# Patient Record
Sex: MALE | Race: WHITE | Employment: FULL TIME | URBAN - METROPOLITAN AREA
[De-identification: names, ages, dates, MRNs, and addresses within clinical notes are randomized per-mention and may not be internally consistent; named-entity substitution may affect disease eponyms.]

---

## 2017-01-02 ENCOUNTER — OFFICE VISIT (OUTPATIENT)
Dept: PHYSICAL THERAPY | Age: 57
End: 2017-01-02
Attending: PODIATRIST
Payer: COMMERCIAL

## 2017-01-02 DIAGNOSIS — M72.2 PLANTAR FASCIITIS: Primary | ICD-10-CM

## 2017-01-02 PROCEDURE — 97110 THERAPEUTIC EXERCISES: CPT

## 2017-01-02 PROCEDURE — 97162 PT EVAL MOD COMPLEX 30 MIN: CPT

## 2017-01-02 NOTE — PROGRESS NOTES
LOWER EXTREMITY EVALUATION:   Referring Physician: Dr. Toan Gould  Diagnosis: L and R plantar fascitis    Date of Service: 1/2/2017     PATIENT SUMMARY   Gavi Swain is a 60year old y/o male who presents to therapy today with complaints of L and R a flexed. Accessory motion: decreased calcaneal glides laterally, decreased talocrural jt glides posteriorly.     Flexibility:    Gastroc-soleus: Rdecreased; L decreased    Strength/MMT:   Knee Foot/Ankle   L and R heel raises, no pain, 4/5 strengt [phone]    Sincerely,  Electronically signed by therapist: Marco Rodríguez

## 2017-01-04 ENCOUNTER — OFFICE VISIT (OUTPATIENT)
Dept: PHYSICAL THERAPY | Age: 57
End: 2017-01-04
Attending: PODIATRIST
Payer: COMMERCIAL

## 2017-01-04 PROCEDURE — 97140 MANUAL THERAPY 1/> REGIONS: CPT

## 2017-01-04 PROCEDURE — 97110 THERAPEUTIC EXERCISES: CPT

## 2017-01-04 PROCEDURE — 97112 NEUROMUSCULAR REEDUCATION: CPT

## 2017-01-04 NOTE — PROGRESS NOTES
Dx: B plantar fascitis         Authorized # of Visits:  8         Next MD visit: none scheduled  Fall Risk: standard         Precautions: n/a             Subjective: Reports doing his HEP. Soleus stretches offer good relief of B foot pain for hours.   Does

## 2017-01-09 ENCOUNTER — APPOINTMENT (OUTPATIENT)
Dept: PHYSICAL THERAPY | Age: 57
End: 2017-01-09
Attending: PODIATRIST
Payer: COMMERCIAL

## 2017-01-11 ENCOUNTER — OFFICE VISIT (OUTPATIENT)
Dept: PHYSICAL THERAPY | Age: 57
End: 2017-01-11
Attending: PODIATRIST
Payer: COMMERCIAL

## 2017-01-11 PROCEDURE — 97112 NEUROMUSCULAR REEDUCATION: CPT

## 2017-01-11 NOTE — PROGRESS NOTES
Dx: B plantar fascitis         Authorized # of Visits:  8         Next MD visit: none scheduled  Fall Risk: standard         Precautions: n/a             Subjective:B foot symptoms continue to improve, quicker than he expected.   Many hours of no plantar fo from floor w/o foot pain.   -Restore L and R ankle strength to WNL's so pt can ambulate in the community with little to no difficulty or foot pain.   -Little to no difficulty or pain with amb 1 flight of stairs reciprocally.   -Independent in progressive H

## 2017-01-16 ENCOUNTER — OFFICE VISIT (OUTPATIENT)
Dept: PHYSICAL THERAPY | Age: 57
End: 2017-01-16
Attending: PODIATRIST
Payer: COMMERCIAL

## 2017-01-16 PROCEDURE — 97140 MANUAL THERAPY 1/> REGIONS: CPT

## 2017-01-16 PROCEDURE — 97112 NEUROMUSCULAR REEDUCATION: CPT

## 2017-01-16 NOTE — PROGRESS NOTES
Dx: B plantar fascitis         Authorized # of Visits:  8         Next MD visit: none scheduled  Fall Risk: standard         Precautions: n/a             Subjective: Was away on business. Reports had no noticeable R foot symptoms, little on L while away. little to no foot pain or difficulty.  .  -Restore L and R soleus muscle length to WFL's so pt can perform squatting activities such as retrieving bag of groceries from floor w/o foot pain.   -Restore L and R ankle strength to WNL's so pt can ambulate in th

## 2017-01-18 ENCOUNTER — OFFICE VISIT (OUTPATIENT)
Dept: PHYSICAL THERAPY | Age: 57
End: 2017-01-18
Attending: PODIATRIST
Payer: COMMERCIAL

## 2017-01-18 PROCEDURE — 97112 NEUROMUSCULAR REEDUCATION: CPT

## 2017-01-18 NOTE — PROGRESS NOTES
Dx: B plantar fascitis         Authorized # of Visits:  8         Next MD visit: none scheduled  Fall Risk: standard         Precautions: n/a             Subjective: Morning heel/foot pain nearly resolved R, minimal on L.         Objective:       Date: 1/4/ heel/ toe x 20 reps  -single elg x 15 each Lateral walks with red band at ankles x 25ft x 4 reps         On table:  Stretch L and R hip intrinsic ER's  3 min                                   Assessment: Progressing quickly with R foot symptoms nearly reso

## 2017-01-23 ENCOUNTER — OFFICE VISIT (OUTPATIENT)
Dept: PHYSICAL THERAPY | Age: 57
End: 2017-01-23
Attending: PODIATRIST
Payer: COMMERCIAL

## 2017-01-23 PROCEDURE — 97112 NEUROMUSCULAR REEDUCATION: CPT

## 2017-01-23 NOTE — PROGRESS NOTES
Dx: B plantar fascitis         Authorized # of Visits:  8 to 10         Next MD visit: none scheduled  Fall Risk: standard         Precautions: n/a             Subjective: Reports best results so far with morning pain.   No pain noticed but mild tenderness reps  -single elg x 15 each Wobble:  -B heel/ toe x 20 reps  -singleleg x 15 each      Wobble:  -B ankle inversion/eversion x 20 reps  -single elg x 10 2 sets each Shuttle: plyo's, single leg, level 2 x 15 each  reciprocally x 30 Rebounder, dynamic balance

## 2017-01-25 ENCOUNTER — OFFICE VISIT (OUTPATIENT)
Dept: PHYSICAL THERAPY | Age: 57
End: 2017-01-25
Attending: PODIATRIST
Payer: COMMERCIAL

## 2017-01-25 PROCEDURE — 97112 NEUROMUSCULAR REEDUCATION: CPT

## 2017-01-25 NOTE — PROGRESS NOTES
Dx: B plantar fascitis         Authorized # of Visits:  8 to 10         Next MD visit: none scheduled  Fall Risk: standard         Precautions: n/a             Subjective:  Pain out of bed in the morning at feet, heels, R 0/10,  L 4/10 (heel only).   L decr scrunches x 2 min Wobble:  -B heel/ toe x 20 reps  -single elg x 10 2 sets each Shuttle:   -B heel raises level 8 x 20 reps  -single leg, level 6, x 15 2 sets each Wobble:  -B heel/ toe x 20 reps  -single elg x 15 each Wobble:  -B heel/ toe x 20 reps  -sin no difficulty or foot pain. Being met  -Little to no difficulty or pain with amb 1 flight of stairs reciprocally. In progress  -Independent in progressive HEP. Plan:   Cont with current focus.        Charges:   NM re ed 3    Total Timed Treatment: 45 mi

## 2017-01-30 ENCOUNTER — OFFICE VISIT (OUTPATIENT)
Dept: PHYSICAL THERAPY | Age: 57
End: 2017-01-30
Attending: PODIATRIST
Payer: COMMERCIAL

## 2017-01-30 PROCEDURE — 97112 NEUROMUSCULAR REEDUCATION: CPT

## 2017-01-30 NOTE — PROGRESS NOTES
Dx: B plantar fascitis         Authorized # of Visits:  8 to 10         Next MD visit: none scheduled  Fall Risk: standard         Precautions: n/a             Subjective:  Pain out of bed in the morning at feet, heels, R 0/10,  L 2-3/10 (heel only).   L de pelvis x 15 2 sets each ASU's with contra LE high step, 8 inch box step,maintain neutral pelvis x 20 each   Review HEP:  L and R gastroc and soleus stretches  4 min  -L and R digit flexion with towel scrunches x 2 min Wobble:  -B heel/ toe x 20 reps  -sing improved, L>R. L > 4/5, R 4/5. Balance improved, > 30 seconds each. Goals:   -Restore L and R gastrocnemius length to WFL's so pt can ambulate in the morning with little to no foot pain or difficulty. Met R foot.   -Restore L and R soleus muscle length t

## 2017-02-01 ENCOUNTER — OFFICE VISIT (OUTPATIENT)
Dept: PHYSICAL THERAPY | Age: 57
End: 2017-02-01
Attending: PODIATRIST
Payer: COMMERCIAL

## 2017-02-06 ENCOUNTER — OFFICE VISIT (OUTPATIENT)
Dept: PHYSICAL THERAPY | Age: 57
End: 2017-02-06
Attending: PODIATRIST
Payer: COMMERCIAL

## 2017-02-06 PROCEDURE — 97112 NEUROMUSCULAR REEDUCATION: CPT

## 2017-02-06 NOTE — PROGRESS NOTES
Dx: B plantar fascitis         Authorized # of Visits:  8 to 10         Next MD visit: none scheduled  Fall Risk: standard         Precautions: n/a             Subjective: Activity. R foot 100% improved, no symptoms with any recent activity.   L 90% impr Rebounder, dynamic balance: Throw/catch #2 ball, all 3 directions, single leg, x 15 each  5 min Rebounder, dynamic balance:   Throw/catch #4 ball, all 3 directions, single leg, x 15 each  5 min Shuttle: plyo's, single leg, level 3 x 15 each  reciprocally ed 3    Total Timed Treatment: 45 min  Total Treatment Time: 45 min

## 2017-02-08 ENCOUNTER — APPOINTMENT (OUTPATIENT)
Dept: PHYSICAL THERAPY | Age: 57
End: 2017-02-08
Attending: PODIATRIST
Payer: COMMERCIAL

## 2017-02-13 ENCOUNTER — OFFICE VISIT (OUTPATIENT)
Dept: PHYSICAL THERAPY | Age: 57
End: 2017-02-13
Attending: PODIATRIST
Payer: COMMERCIAL

## 2017-02-13 PROCEDURE — 97112 NEUROMUSCULAR REEDUCATION: CPT

## 2017-02-13 NOTE — PROGRESS NOTES
Dx: B plantar fascitis         Authorized # of Visits:  8 to 10         Next MD visit: none scheduled  Fall Risk: standard         Precautions: n/a         Physical Therapy Discharge Report  10 sessions completed        Subjective: Activity.   R foot 100% 2-4  5 min: Bike upright:  Level 2-4  5 min: Bike upright:  Level 2-4  5 min   Shuttle:   -B heel raises level 8 x 20 reps  -single leg, level 6, x 15 2 sets each Shuttle:   -B heel raises level 8 x 20 reps  -single leg, level8, x 10 2 sets each R and L qu box step, x 10 2 sets each  1/4 lunge on toes x 10 2 sets each    Deep lunge x 10 each Sit: toe crunches, digit flexion with towel x 30  each Sit: toe crunches, digit flexion with towel x 30  each   On table:  Stretch L and R hip intrinsic ER's  3 min Sit:

## 2017-10-11 PROCEDURE — 87086 URINE CULTURE/COLONY COUNT: CPT | Performed by: INTERNAL MEDICINE

## 2018-10-24 PROBLEM — M25.462 EFFUSION, LEFT KNEE: Status: ACTIVE | Noted: 2018-10-24

## 2019-12-20 PROBLEM — M25.462 EFFUSION, LEFT KNEE: Status: RESOLVED | Noted: 2018-10-24 | Resolved: 2019-12-20

## 2021-08-08 ENCOUNTER — HOSPITAL ENCOUNTER (EMERGENCY)
Facility: HOSPITAL | Age: 61
Discharge: HOME | End: 2021-08-08
Attending: EMERGENCY MEDICINE
Payer: COMMERCIAL

## 2021-08-08 VITALS
WEIGHT: 240.31 LBS | HEART RATE: 80 BPM | RESPIRATION RATE: 16 BRPM | OXYGEN SATURATION: 97 % | DIASTOLIC BLOOD PRESSURE: 80 MMHG | TEMPERATURE: 99 F | SYSTOLIC BLOOD PRESSURE: 162 MMHG | BODY MASS INDEX: 34 KG/M2

## 2021-08-08 DIAGNOSIS — S61.313A LACERATION OF LEFT MIDDLE FINGER WITHOUT FOREIGN BODY WITH DAMAGE TO NAIL, INITIAL ENCOUNTER: Primary | ICD-10-CM

## 2021-08-08 PROCEDURE — 12001 RPR S/N/AX/GEN/TRNK 2.5CM/<: CPT

## 2021-08-08 PROCEDURE — 99282 EMERGENCY DEPT VISIT SF MDM: CPT

## 2021-08-08 PROCEDURE — 99283 EMERGENCY DEPT VISIT LOW MDM: CPT

## 2021-08-08 RX ORDER — LIDOCAINE HYDROCHLORIDE 10 MG/ML
INJECTION, SOLUTION INFILTRATION; PERINEURAL
Status: COMPLETED
Start: 2021-08-08 | End: 2021-08-08

## 2021-08-08 NOTE — ED PROVIDER NOTES
Patient Seen in: [facility] Emergency Department      History   Patient presents with:  Laceration/Abrasion    Stated Complaint: finger laceration    HPI/Subjective:   HPI    59-year-old male who cut his left middle finger just proximal to the end o using digital block with 1% lidocaine of the left middle finger. The wound was irrigated with normal saline. The wound was prepped and draped in the normal sterile fashion. The wound was explored for foreign bodies and none were found.  The edges were reap

## 2021-08-16 ENCOUNTER — HOSPITAL ENCOUNTER (OUTPATIENT)
Age: 61
Discharge: LEFT BEFORE DISCHARGE | End: 2021-08-16
Payer: COMMERCIAL

## 2021-08-20 ENCOUNTER — HOSPITAL ENCOUNTER (OUTPATIENT)
Age: 61
Discharge: HOME | End: 2021-08-20
Payer: COMMERCIAL

## 2021-08-20 VITALS
HEIGHT: 71 IN | SYSTOLIC BLOOD PRESSURE: 140 MMHG | DIASTOLIC BLOOD PRESSURE: 67 MMHG | BODY MASS INDEX: 29.4 KG/M2 | TEMPERATURE: 98 F | HEART RATE: 56 BPM | OXYGEN SATURATION: 97 % | WEIGHT: 210 LBS | RESPIRATION RATE: 16 BRPM

## 2021-08-20 DIAGNOSIS — Z11.52 ENCOUNTER FOR SCREENING FOR COVID-19: Primary | ICD-10-CM

## 2021-08-20 LAB — SARS-COV-2 RNA RESP QL NAA+PROBE: NOT DETECTED

## 2021-08-20 PROCEDURE — 99202 OFFICE O/P NEW SF 15 MIN: CPT | Performed by: PHYSICIAN ASSISTANT

## 2021-08-20 PROCEDURE — U0002 COVID-19 LAB TEST NON-CDC: HCPCS | Performed by: PHYSICIAN ASSISTANT

## 2021-08-20 RX ORDER — MULTIVIT-MIN/IRON/FOLIC ACID/K 18-600-40
CAPSULE ORAL
COMMUNITY
Start: 2020-03-01

## 2021-08-20 RX ORDER — MULTIVIT WITH MINERALS/LUTEIN
TABLET ORAL
COMMUNITY
Start: 2020-03-01

## 2021-08-20 RX ORDER — TAMSULOSIN HYDROCHLORIDE 0.4 MG/1
0.4 CAPSULE ORAL NIGHTLY
COMMUNITY
Start: 2021-07-14

## 2021-08-20 NOTE — ED INITIAL ASSESSMENT (HPI)
The patient is here for a COVID test for travel. He leaves tomorrow for [place] ([facility]). Denies any symptoms. He has received his Pfiezer vaccines, last dose was 4/2021.

## 2021-08-20 NOTE — ED PROVIDER NOTES
Patient Seen in: Immediate [address] [place] [address]      History   Patient presents with:  Covid-19 Test    Stated Complaint: Covid-19 Test    HPI/Subjective:   HPI    Valerie Guaman is a 67-year-old male who presents for Covid testing today.   He has a past medical hi kg/m²         Physical Exam    Nursing note reviewed. Vital signs reviewed. Constitutional: Oriented to person, place, and time. Patient appears well-developed and well-nourished, non-toxic and in no acute distress. Actively wearing a mask.    Head: Norm

## 2021-11-09 ENCOUNTER — HOSPITAL ENCOUNTER (OUTPATIENT)
Age: 61
Discharge: HOME | End: 2021-11-09
Payer: COMMERCIAL

## 2021-11-09 VITALS
OXYGEN SATURATION: 97 % | HEART RATE: 69 BPM | BODY MASS INDEX: 30.8 KG/M2 | HEIGHT: 71 IN | WEIGHT: 220 LBS | SYSTOLIC BLOOD PRESSURE: 151 MMHG | DIASTOLIC BLOOD PRESSURE: 73 MMHG | TEMPERATURE: 98 F | RESPIRATION RATE: 16 BRPM

## 2021-11-09 DIAGNOSIS — Z20.822 COVID-19 RULED OUT BY LABORATORY TESTING: Primary | ICD-10-CM

## 2021-11-09 PROCEDURE — 99212 OFFICE O/P EST SF 10 MIN: CPT | Performed by: PHYSICIAN ASSISTANT

## 2021-11-09 PROCEDURE — U0002 COVID-19 LAB TEST NON-CDC: HCPCS | Performed by: NURSE PRACTITIONER

## 2021-11-09 NOTE — ED PROVIDER NOTES
Patient Seen [facility]: [facility] [address]      History   Patient presents with:  Testing    Stated Complaint: covid test    Subjective:   HPI    26-year-old male here for Covid testing due to travel. Patient has been fully vaccinated.   Patient de He is well-developed. HENT:      Head: Normocephalic.       Right Ear: External ear normal.      Left Ear: External ear normal.      Nose: Nose normal.      Mouth/Throat:      Mouth: Mucous membranes are moist.   Eyes:      Conjunctiva/sclera: Conjunctiva the patient, who expresses understanding. All questions and concerns are addressed to the patient's satisfaction prior to discharge today. Previous conversations with PCP and charts were reviewed.                                 Disposition and Plan     Cl

## 2021-11-17 ENCOUNTER — HOSPITAL ENCOUNTER (OUTPATIENT)
Age: 61
Discharge: HOME | End: 2021-11-17
Payer: COMMERCIAL

## 2021-11-17 VITALS
HEART RATE: 67 BPM | TEMPERATURE: 98 F | SYSTOLIC BLOOD PRESSURE: 160 MMHG | WEIGHT: 215 LBS | BODY MASS INDEX: 30.1 KG/M2 | OXYGEN SATURATION: 98 % | DIASTOLIC BLOOD PRESSURE: 93 MMHG | HEIGHT: 71 IN | RESPIRATION RATE: 16 BRPM

## 2021-11-17 DIAGNOSIS — Z11.52 ENCOUNTER FOR SCREENING FOR COVID-19: Primary | ICD-10-CM

## 2021-11-17 PROCEDURE — 99212 OFFICE O/P EST SF 10 MIN: CPT | Performed by: NURSE PRACTITIONER

## 2021-11-17 PROCEDURE — U0002 COVID-19 LAB TEST NON-CDC: HCPCS | Performed by: NURSE PRACTITIONER

## 2021-11-17 NOTE — ED PROVIDER NOTES
[facility] [address]      History   Patient presents with:  Covid-19 Test    Stated Complaint: covid test for travel    Subjective:   Patient presents to immediate care for COVID testing.   Patient states he needs Covid testing distress. Appearance: Normal appearance. He is not ill-appearing. HENT:      Head: Normocephalic. Cardiovascular:      Rate and Rhythm: Normal rate.    Pulmonary:      Effort: Pulmonary effort is normal.   Musculoskeletal:         General: Normal ra

## 2022-02-18 ENCOUNTER — HOSPITAL ENCOUNTER (OUTPATIENT)
Age: 62
Discharge: HOME | End: 2022-02-18
Payer: COMMERCIAL

## 2022-02-18 VITALS
WEIGHT: 225 LBS | DIASTOLIC BLOOD PRESSURE: 63 MMHG | RESPIRATION RATE: 18 BRPM | TEMPERATURE: 99 F | OXYGEN SATURATION: 96 % | HEART RATE: 73 BPM | SYSTOLIC BLOOD PRESSURE: 135 MMHG | BODY MASS INDEX: 31.5 KG/M2 | HEIGHT: 71 IN

## 2022-02-18 DIAGNOSIS — Z20.822 ENCOUNTER FOR LABORATORY TESTING FOR COVID-19 VIRUS: Primary | ICD-10-CM

## 2022-02-18 LAB — SARS-COV-2 RNA RESP QL NAA+PROBE: NOT DETECTED

## 2022-02-18 PROCEDURE — 99212 OFFICE O/P EST SF 10 MIN: CPT | Performed by: NURSE PRACTITIONER

## 2022-02-18 PROCEDURE — U0002 COVID-19 LAB TEST NON-CDC: HCPCS | Performed by: NURSE PRACTITIONER

## 2022-07-20 ENCOUNTER — HOSPITAL ENCOUNTER (OUTPATIENT)
Age: 62
Discharge: HOME | End: 2022-07-20
Payer: COMMERCIAL

## 2022-07-20 VITALS
WEIGHT: 225 LBS | OXYGEN SATURATION: 97 % | HEIGHT: 71 IN | HEART RATE: 74 BPM | BODY MASS INDEX: 31.5 KG/M2 | DIASTOLIC BLOOD PRESSURE: 74 MMHG | TEMPERATURE: 99 F | RESPIRATION RATE: 20 BRPM | SYSTOLIC BLOOD PRESSURE: 152 MMHG

## 2022-07-20 DIAGNOSIS — U07.1 COVID-19: Primary | ICD-10-CM

## 2022-07-20 DIAGNOSIS — J20.9 ACUTE BRONCHITIS, UNSPECIFIED ORGANISM: ICD-10-CM

## 2022-07-20 LAB — SARS-COV-2 RNA RESP QL NAA+PROBE: DETECTED

## 2022-07-20 PROCEDURE — U0002 COVID-19 LAB TEST NON-CDC: HCPCS | Performed by: NURSE PRACTITIONER

## 2022-07-20 PROCEDURE — 99213 OFFICE O/P EST LOW 20 MIN: CPT | Performed by: NURSE PRACTITIONER

## 2022-07-20 RX ORDER — BUDESONIDE AND FORMOTEROL FUMARATE DIHYDRATE 160; 4.5 UG/1; UG/1
2 AEROSOL RESPIRATORY (INHALATION) 2 TIMES DAILY
Qty: 10.2 G | Refills: 0 | Status: SHIPPED | OUTPATIENT
Start: 2022-07-20

## 2022-07-20 RX ORDER — ALBUTEROL SULFATE 90 UG/1
2 AEROSOL, METERED RESPIRATORY (INHALATION) EVERY 4 HOURS PRN
Qty: 18 G | Refills: 0 | Status: SHIPPED | OUTPATIENT
Start: 2022-07-20

## 2024-02-13 ENCOUNTER — HOSPITAL ENCOUNTER (OUTPATIENT)
Age: 64
Discharge: HOME | End: 2024-02-13
Payer: COMMERCIAL

## 2024-02-13 VITALS
HEART RATE: 85 BPM | RESPIRATION RATE: 18 BRPM | SYSTOLIC BLOOD PRESSURE: 152 MMHG | DIASTOLIC BLOOD PRESSURE: 74 MMHG | OXYGEN SATURATION: 96 % | TEMPERATURE: 98 F

## 2024-02-13 DIAGNOSIS — J34.89 STUFFY AND RUNNY NOSE: ICD-10-CM

## 2024-02-13 DIAGNOSIS — B96.89 BACTERIAL SINUSITIS: Primary | ICD-10-CM

## 2024-02-13 DIAGNOSIS — R05.9 COUGH: ICD-10-CM

## 2024-02-13 DIAGNOSIS — J32.9 BACTERIAL SINUSITIS: Primary | ICD-10-CM

## 2024-02-13 LAB
POCT INFLUENZA A: NEGATIVE
POCT INFLUENZA B: NEGATIVE
SARS-COV-2 RNA RESP QL NAA+PROBE: NOT DETECTED

## 2024-02-13 PROCEDURE — U0002 COVID-19 LAB TEST NON-CDC: HCPCS | Performed by: NURSE PRACTITIONER

## 2024-02-13 PROCEDURE — 99213 OFFICE O/P EST LOW 20 MIN: CPT | Performed by: NURSE PRACTITIONER

## 2024-02-13 PROCEDURE — 87502 INFLUENZA DNA AMP PROBE: CPT | Performed by: NURSE PRACTITIONER

## 2024-02-13 RX ORDER — ALBUTEROL SULFATE 90 UG/1
1-2 AEROSOL, METERED RESPIRATORY (INHALATION)
Qty: 18 G | Refills: 0 | Status: SHIPPED | OUTPATIENT
Start: 2024-02-13

## 2024-02-13 RX ORDER — AMOXICILLIN AND CLAVULANATE POTASSIUM 875; 125 MG/1; MG/1
1 TABLET, FILM COATED ORAL 2 TIMES DAILY
Qty: 14 TABLET | Refills: 0 | Status: SHIPPED | OUTPATIENT
Start: 2024-02-13 | End: 2024-02-20

## 2024-02-13 NOTE — DISCHARGE INSTRUCTIONS
Use inhaler (1-2 puffs every 4-6 hours) with spacer as needed for chest tightness, wheezing, and/or bronchospasm      Upper respiratory infection supportive care measures to try as applicable:  General:   - Wash hands often   - Disinfect your environment, linens, electronics, etc.   - Drink plenty of fluids (water, Pedialyte, etc.)   - Get plenty of rest and sleep with head elevated to help with sinus drainage and throat irritation   - Avoid having air blow on your face as this can worsen congestion / cough   - Do not share utensils or drinks   - Alternate Ibuprofen (adult: 600mg) and Tylenol (adult: 650-1000mg) as needed for pain / body aches / fever   - Symptoms may take a few weeks to resolve   - You may benefit from taking a daily multivitamin   - You may benefit from Zinc (~20mg) every other day for a week while sick   - You may benefit from Vitamin D daily (~2000u)   - Change toothbrush    Sore throat:   - Salt water gargles throughout the day for sore throat   - Cepacol lozenges as needed for sore throat    Cough / Sinus:   - You may benefit from spoonfuls (and/or added to warm drinks) of honey throughout the day for cough   - You may benefit from taking a decongestant (e.g. Sudafed - pseudoephedrine [behind the pharmacy counter]) (may temporarily elevate your heart rate and blood pressure)   - You may benefit from using a humidifier and/or steam showers   - You may benefit from Flonase nasal spray daily   - You may benefit from taking a daily allergy medication (e.g. Zyrtec, Xyzal, etc.)   - You may benefit from boiling water with lemon and cayenne pepper, then breathing in the steam (you can cover your head with a towel to help funnel the steam)

## 2024-02-13 NOTE — ED PROVIDER NOTES
History     Chief Complaint   Patient presents with    Cough/URI       Subjective:   HPI    Ruben Argueta, 63 year old male with notable medical history of hearing loss, seasonal rhinitis, asthma who presents with sinus congestion and cough. Patient reports s/s started 3-4 days ago w/o fever, chills, n/v.  He reports worsening sinus pressure / pain, and some burning over the past couple days. He reports usually having his symptoms start to improve after a few days, but these symptoms are stronger and lasting longer than usual.        Objective:   Past Medical History:   Diagnosis Date    Back pain     Unspecified sleep apnea HST 9-23-14    AHI 7 RDI 8 SaO2 leelee 50 %              Past Surgical History:   Procedure Laterality Date    COLONOSCOPY  3/19/12  [facility]    Screening: int hemorrhoids; next colonoscopy in 10 yrs    OTHER SURGICAL HISTORY  2011    R knee meniscus repair    OTHER SURGICAL HISTORY  2011    R knee meniscus repair                Social History     Socioeconomic History    Marital status:    Tobacco Use    Smoking status: Never    Smokeless tobacco: Never    Tobacco comments:     cigar once per month   Vaping Use    Vaping Use: Never used   Substance and Sexual Activity    Alcohol use: Yes     Comment: couple beer/wine per week    Drug use: No              Review of Systems   HENT:  Positive for congestion, sinus pressure and sinus pain.    Respiratory:  Positive for cough.    All other systems reviewed and are negative.        Constitutional and vital signs reviewed.      All other systems reviewed and negative except as noted above.    I have reviewed the family history, social history, allergies, and outpatient medications.     History reviewed from EMR: Encounters, problem list, allergies, medications      Physical Exam     ED Triage Vitals [02/13/24 0931]   /74   Pulse 85   Resp 18   Temp 97.9 °F (36.6 °C)   Temp src    SpO2 96 %   O2 Device None (Room air)       Current:BP  152/74   Pulse 85   Temp 97.9 °F (36.6 °C)   Resp 18   SpO2 96%       Physical Exam  Vitals and nursing note reviewed.   Constitutional:       General: He is not in acute distress.     Appearance: Normal appearance. He is normal weight. He is not ill-appearing or toxic-appearing.   HENT:      Head: Normocephalic and atraumatic.      Right Ear: External ear normal.      Left Ear: External ear normal.      Nose: Nasal tenderness, mucosal edema and congestion present. No rhinorrhea.      Mouth/Throat:      Mouth: Mucous membranes are moist.   Eyes:      Extraocular Movements: Extraocular movements intact.      Conjunctiva/sclera: Conjunctivae normal.      Pupils: Pupils are equal, round, and reactive to light.   Cardiovascular:      Rate and Rhythm: Normal rate and regular rhythm.      Pulses: Normal pulses.   Pulmonary:      Effort: Pulmonary effort is normal. No respiratory distress.      Comments: LS clear throughout. +dry cough  Musculoskeletal:         General: No swelling, tenderness or signs of injury. Normal range of motion.      Cervical back: Normal range of motion.   Skin:     General: Skin is warm and dry.      Capillary Refill: Capillary refill takes less than 2 seconds.   Neurological:      General: No focal deficit present.      Mental Status: He is alert and oriented to person, place, and time. Mental status is at baseline.   Psychiatric:         Mood and Affect: Mood normal.         Behavior: Behavior normal.         Thought Content: Thought content normal.         Judgment: Judgment normal.            ED Course     Labs Reviewed   POCT FLU TEST - Normal    Narrative:     This assay is a rapid molecular in vitro test utilizing nucleic acid amplification of influenza A and B viral RNA.   RAPID SARS-COV-2 BY PCR - Normal     No orders to display       Vitals:    02/13/24 0931   BP: 152/74   Pulse: 85   Resp: 18   Temp: 97.9 °F (36.6 °C)   SpO2: 96%            [facility]        Ruben Argueta, 63 year old  male with medical history as noted above who presents with sinus congestion / pain, cough   - Patient in NAD, VSS   - viral URI vs sinusitis vs other   - Covid, Flu swabs negative   - Given exam and duration of symptoms, will treat for bacterial sinusitis   - New Rx for albuterol also provided given patient's Hx exercise induced asthma and no longer having current inhaler   - Supportive care and infection control measures discussed   - f/u with primary care provider as needed       ** See ED course for additional information on care provided / interventions / notable events throughout patient's encounter.    ** See below for home care instructions (if applicable)         I have independently reviewed the radiology images, clinical lab results, and ECG tracings as described above (if applicable)        Medical Decision Making  Amount and/or Complexity of Data Reviewed  Labs: ordered. Decision-making details documented in ED Course.    Risk  OTC drugs.  Prescription drug management.        Disposition and Plan     Clinical Impression:  1. Bacterial sinusitis    2. Cough    3. Stuffy and runny nose         Disposition:  Discharge  2/13/2024 10:24 am    Follow-up:  Allan Tubbs  [facility] [facility] [id number] [phone]      As needed          Medications Prescribed:  Current Discharge Medication List        START taking these medications    Details   amoxicillin clavulanate 875-125 MG Oral Tab Take 1 tablet by mouth 2 (two) times daily for 7 days.  Qty: 14 tablet, Refills: 0             The above patient (and/or guardian) was made aware that an appropriate evaluation has been performed, and that no additional testing is required at this time. In my medical judgment, there is currently no evidence of an immediate life-threatening or surgical condition, therefore discharge is indicated at this time. The patient (and/or guardian) was advised that a small risk still exists that a serious  condition could develop. The patient was instructed to arrange close follow-up with their primary care provider (or the referral provider given today). The patient received written and verbal instructions regarding their condition / concerns, demonstrated understanding, and is agreement with the outpatient treatment plan.        Home care instructions:    Use inhaler (1-2 puffs every 4-6 hours) with spacer as needed for chest tightness, wheezing, and/or bronchospasm      Upper respiratory infection supportive care measures to try as applicable:  General:   - Wash hands often   - Disinfect your environment, linens, electronics, etc.   - Drink plenty of fluids (water, Pedialyte, etc.)   - Get plenty of rest and sleep with head elevated to help with sinus drainage and throat irritation   - Avoid having air blow on your face as this can worsen congestion / cough   - Do not share utensils or drinks   - Alternate Ibuprofen (adult: 600mg) and Tylenol (adult: 650-1000mg) as needed for pain / body aches / fever   - Symptoms may take a few weeks to resolve   - You may benefit from taking a daily multivitamin   - You may benefit from Zinc (~20mg) every other day for a week while sick   - You may benefit from Vitamin D daily (~2000u)   - Change toothbrush    Sore throat:   - Salt water gargles throughout the day for sore throat   - Cepacol lozenges as needed for sore throat    Cough / Sinus:   - You may benefit from spoonfuls (and/or added to warm drinks) of honey throughout the day for cough   - You may benefit from taking a decongestant (e.g. Sudafed - pseudoephedrine [behind the pharmacy counter]) (may temporarily elevate your heart rate and blood pressure)   - You may benefit from using a humidifier and/or steam showers   - You may benefit from Flonase nasal spray daily   - You may benefit from taking a daily allergy medication (e.g. Zyrtec, Xyzal, etc.)   - You may benefit from boiling water with lemon and cayenne pepper,  then breathing in the steam (you can cover your head with a towel to help funnel the steam)      Mike Salinas, DNP, APRN, AGACNP-BC, FNP-C, CNL  Adult-Gerontology Acute Care & Family Nurse Practitioner  [facility]

## (undated) NOTE — MR AVS SNAPSHOT
JESUS Michaela [address]errero [address]  [phone]               Thank you for choosing us for your health care visit with Simone Sol, TERESA. We are glad to serve you and happy to provide you with this summary of your visit.   Please Call [phone] for help. [employer] is NOT to be used for urgent needs. For medical emergencies, dial 911.            Visit [facility] online at  [email or web address]

## (undated) NOTE — MR AVS SNAPSHOT
[address]  [phone]               Thank you for choosing us for your health care visit with Simone Sol, TERESA. We are glad to serve you and happy to provide you with this summary of your visit.   Please staff. They will take your name and direct you to our P.T. clinic within the building. For security purposes, please check in with the reception staff at every visit.                                           Jan 18, 2017  7:00 AM   PT VISIT BY THERAPIST wi Your appointment is scheduled at the [facility] Physical Therapy Department, inside the [address] [place] [address], at [facility] (enter via [facility]).   Convenient parking is available in the parking lot in front of the Fi

## (undated) NOTE — MR AVS SNAPSHOT
JESUS Michaela [address]youb[address]  [phone]               Thank you for choosing us for your health care visit with Simone Sol, PT. We are glad to serve you and happy to provide you with this summary of your visit.   Please staff. They will take your name and direct you to our P.T. clinic within the building. For security purposes, please check in with the reception staff at every visit.                                           Feb 01, 2017  7:00 AM   PT VISIT BY THERAPIST wi Current Medications          This list is accurate as of: 1/23/17  8:06 AM.  Always use your most recent med list.                azithromycin 250 MG Tabs   Take two tablets today, then one tablet daily for 4 more days   Commonly known as:  ZI

## (undated) NOTE — MR AVS SNAPSHOT
[address]erre[address] [phone]               Thank you for choosing us for your health care visit with Simone Sol, PT. We are glad to serve you and happy to provide you with this summary of your visit.   Please staff. They will take your name and direct you to our P.T. clinic within the building. For security purposes, please check in with the reception staff at every visit.                                           Mar 13, 2017  9:40 AM   NEW TO [facility] with Kristin Griffin

## (undated) NOTE — MR AVS SNAPSHOT
[address]  [phone]               Thank you for choosing us for your health care visit with Simone Sol, PT. We are glad to serve you and happy to provide you with this summary of your visit.   Please Commonly known as:  EPIPEN 2-JAREN           MULTI-VITAMIN OR   1 tab. x 4 dys. wkly           SYMBICORT 160-4.5 MCG/ACT Aero   Generic drug:  Budesonide-Formoterol Fumarate   Inhale 2 puffs into the lungs 2 (two) times daily.                    MyChart     V

## (undated) NOTE — Clinical Note
Patient Name: Bethany Mallory  YOB: 1960          MRN number:  [id number]  Date:  1/2/2017  Referring Physician:  Yamilka Rdz         LOWER EXTREMITY EVALUATION:    Referring Physician: Dr. Coleman Townsend    Diagnosis: L and R plantar fasc painful nodules. Negative L and R heel and hind foot regions. AROM:   Foot/Ankle   Has full ankle and 1st ray extension AROM/PROM except Dorsiflexion 10* each with knee straight, 20* with knees flexed.             Accessory motion: decreased calcanea treatment options and has agreed to actively participate in planning and for this course of care.     Thank you for your referral. If you have any questions, please contact me at Dept: [phone]    Sincerely,  Electronically signed by therapist: Jenna Pereyra

## (undated) NOTE — MR AVS SNAPSHOT
[address]ro [address] [phone]               Thank you for choosing us for your health care visit with Simone Sol, PT. We are glad to serve you and happy to provide you with this summary of your visit.   Please staff. They will take your name and direct you to our P.T. clinic within the building. For security purposes, please check in with the reception staff at every visit.                                           Jan 23, 2017  7:00 AM   PT VISIT BY THERAPIST wi Current Medications          This list is accurate as of: 1/11/17  9:06 AM.  Always use your most recent med list.                azithromycin 250 MG Tabs   Take two tablets today, then one tablet daily for 4 more days   Commonly known as:  ZI

## (undated) NOTE — MR AVS SNAPSHOT
JESUS Michaela Guerrero [address]  [phone]               Thank you for choosing us for your health care visit with Simone Sol PT. We are glad to serve you and happy to provide you with this summary of your visit.   Please enter the [employer], please check in with the  reception staff. They will take your name and direct you to our P.T. clinic within the building. For security purposes, please check in with the reception staff at every visit. Elian Pacheco 70   [phone]           Your appointment is scheduled at the [facility] OF [facility] Physical Therapy Department, inside the PATIENT’S [facility] OF [facility], at [facility] (enter via [facility]).   Convenient parking is [employer]

## (undated) NOTE — MR AVS SNAPSHOT
JESUS Michaela Guerrero [address]  [phone]               Thank you for choosing us for your health care visit with Simone Sol, PT. We are glad to serve you and happy to provide you with this summary of your visit.   Please staff. They will take your name and direct you to our P.T. clinic within the building. For security purposes, please check in with the reception staff at every visit.                                           Feb 08, 2017  7:00 AM   PT VISIT BY THERAPIST wi [email or web address]. If you've recently had a stay at the Hospital you can access your discharge instructions in Pryv by going to Visits < Admission Summaries.  If you've been to the Emergency Department or your doctor's office, you can view yo

## (undated) NOTE — MR AVS SNAPSHOT
JESUS Michaela Guerrero [address]  [phone]               Thank you for choosing us for your health care visit with Simone Sol, TERESA. We are glad to serve you and happy to provide you with this summary of your visit.   Please staff. They will take your name and direct you to our P.T. clinic within the building. For security purposes, please check in with the reception staff at every visit.                                           Mar 13, 2017  9:40 AM   NEW TO [facility] with John Morejon

## (undated) NOTE — Clinical Note
Patient Name: Haseeb Calderon  YOB: 1960          MRN number:  [id number]  Date:  2/13/2017  Referring Physician:  Fiordaliza Carlisle DPM    Dx: B plantar fascitis                 Physical Therapy Discharge Report  10 sessions completed

## (undated) NOTE — MR AVS SNAPSHOT
JESUS Michaela Guerrero [address]  [phone]               Thank you for choosing us for your health care visit with Simone Sol, PT. We are glad to serve you and happy to provide you with this summary of your visit.   Please staff. They will take your name and direct you to our P.T. clinic within the building. For security purposes, please check in with the reception staff at every visit.                                           Jan 25, 2017  7:00 AM   PT VISIT BY THERAPIST wi [email or web address]. If you've recently had a stay at the Hospital you can access your discharge instructions in Erly by going to Visits < Admission Summaries.  If you've been to the Emergency Department or your doctor's office, you can view yo